# Patient Record
Sex: MALE | Race: OTHER | NOT HISPANIC OR LATINO | URBAN - METROPOLITAN AREA
[De-identification: names, ages, dates, MRNs, and addresses within clinical notes are randomized per-mention and may not be internally consistent; named-entity substitution may affect disease eponyms.]

---

## 2024-04-15 ENCOUNTER — OV NP (OUTPATIENT)
Dept: URBAN - METROPOLITAN AREA CLINIC 70 | Facility: CLINIC | Age: 44
End: 2024-04-15
Payer: COMMERCIAL

## 2024-04-15 VITALS
DIASTOLIC BLOOD PRESSURE: 73 MMHG | BODY MASS INDEX: 27.2 KG/M2 | WEIGHT: 190 LBS | TEMPERATURE: 98.3 F | HEIGHT: 70 IN | HEART RATE: 85 BPM | SYSTOLIC BLOOD PRESSURE: 125 MMHG

## 2024-04-15 DIAGNOSIS — K74.60 CIRRHOSIS OF LIVER WITHOUT ASCITES, UNSPECIFIED HEPATIC CIRRHOSIS TYPE: ICD-10-CM

## 2024-04-15 DIAGNOSIS — Z86.19 HISTORY OF HEPATITIS C: ICD-10-CM

## 2024-04-15 DIAGNOSIS — K92.2 GASTROINTESTINAL HEMORRHAGE, UNSPECIFIED GASTROINTESTINAL HEMORRHAGE TYPE: ICD-10-CM

## 2024-04-15 DIAGNOSIS — D50.0 BLOOD LOSS ANEMIA: ICD-10-CM

## 2024-04-15 PROBLEM — 19943007: Status: ACTIVE | Noted: 2024-04-15

## 2024-04-15 PROBLEM — 413532003: Status: ACTIVE | Noted: 2024-04-15

## 2024-04-15 PROCEDURE — 99204 OFFICE O/P NEW MOD 45 MIN: CPT | Performed by: NURSE PRACTITIONER

## 2024-04-15 RX ORDER — PANTOPRAZOLE SODIUM 40 MG/1
1 TABLET TABLET, DELAYED RELEASE ORAL ONCE A DAY
Qty: 90 TABLET | Refills: 3

## 2024-04-15 RX ORDER — FERROUS SULFATE 325(65) MG
TAKE 1 TABLET BY MOUTH THREE TIMES DAILY TABLET ORAL
Qty: 90 EACH | Refills: 0 | Status: ACTIVE | COMMUNITY

## 2024-04-15 RX ORDER — PANTOPRAZOLE 40 MG/1
TAKE 1 TABLET BY MOUTH EVERY DAY TABLET, DELAYED RELEASE ORAL
Qty: 30 EACH | Refills: 1 | Status: ACTIVE | COMMUNITY

## 2024-04-15 NOTE — HPI-TODAY'S VISIT:
Patient is a 44 y/o male who presents today for GI bleed/anemia. He has has GI bleeding with severe blood loss anemia since last December. He has had multiple ER/hospitalizations with multiple blood transfusions and scopes with no source found. In december 2023 Hgb was ~ 2. He was given blood and underwent scopes by provider in [place] and again had scopes at [place] however records are unavailable. He most recently went to [facility] 3 days ago with c/o dark/maroon stool with Hgb found to be 4.8 on admission with 2 units PRBCs given and CTA of abdomen showing cirrhosis (new dx; hx of HCV s/p tx per pt; no long drinks alcohol) but no active bleeding found. It was recommended that he be admitted but he left AMA due to having to  his children. He presents today and reports continued rectal bleeding with bright red blood. Denies fever, CP, SOB, abdominal pain, wt loss, vomiting/hematemsis, diarrhea, or constipation.

## 2024-06-10 ENCOUNTER — CLAIMS CREATED FROM THE CLAIM WINDOW (OUTPATIENT)
Dept: URBAN - METROPOLITAN AREA MEDICAL CENTER 39 | Facility: MEDICAL CENTER | Age: 44
End: 2024-06-10
Payer: COMMERCIAL

## 2024-06-10 DIAGNOSIS — K62.5 RECTAL BLEEDING: ICD-10-CM

## 2024-06-10 DIAGNOSIS — K74.60 CIRRHOSIS: ICD-10-CM

## 2024-06-10 DIAGNOSIS — D50.9 IRON DEFICIENCY ANEMIA: ICD-10-CM

## 2024-06-10 PROCEDURE — 99255 IP/OBS CONSLTJ NEW/EST HI 80: CPT | Performed by: INTERNAL MEDICINE

## 2024-06-10 PROCEDURE — G8427 DOCREV CUR MEDS BY ELIG CLIN: HCPCS | Performed by: INTERNAL MEDICINE

## 2024-06-10 PROCEDURE — 99223 1ST HOSP IP/OBS HIGH 75: CPT | Performed by: INTERNAL MEDICINE

## 2024-06-11 ENCOUNTER — CLAIMS CREATED FROM THE CLAIM WINDOW (OUTPATIENT)
Dept: URBAN - METROPOLITAN AREA MEDICAL CENTER 39 | Facility: MEDICAL CENTER | Age: 44
End: 2024-06-11
Payer: COMMERCIAL

## 2024-06-11 DIAGNOSIS — K92.2 GI BLEEDING: ICD-10-CM

## 2024-06-11 DIAGNOSIS — D64.89 NORMOCYTIC ANEMIA: ICD-10-CM

## 2024-06-11 PROCEDURE — 44360 SMALL BOWEL ENDOSCOPY: CPT | Performed by: INTERNAL MEDICINE

## 2024-06-11 PROCEDURE — 91110 GI TRC IMG INTRAL ESOPH-ILE: CPT | Performed by: INTERNAL MEDICINE

## 2024-06-13 ENCOUNTER — CLAIMS CREATED FROM THE CLAIM WINDOW (OUTPATIENT)
Dept: URBAN - METROPOLITAN AREA MEDICAL CENTER 39 | Facility: MEDICAL CENTER | Age: 44
End: 2024-06-13
Payer: COMMERCIAL

## 2024-06-13 DIAGNOSIS — A63.0 CONDYLOMA: ICD-10-CM

## 2024-06-13 DIAGNOSIS — K62.5 ANAL BLEEDING: ICD-10-CM

## 2024-06-13 PROCEDURE — 45380 COLONOSCOPY AND BIOPSY: CPT | Performed by: INTERNAL MEDICINE

## 2024-06-13 PROCEDURE — 45378 DIAGNOSTIC COLONOSCOPY: CPT | Performed by: INTERNAL MEDICINE

## 2024-06-14 ENCOUNTER — CLAIMS CREATED FROM THE CLAIM WINDOW (OUTPATIENT)
Dept: URBAN - METROPOLITAN AREA MEDICAL CENTER 39 | Facility: MEDICAL CENTER | Age: 44
End: 2024-06-14

## 2024-06-14 PROCEDURE — 99232 SBSQ HOSP IP/OBS MODERATE 35: CPT | Performed by: STUDENT IN AN ORGANIZED HEALTH CARE EDUCATION/TRAINING PROGRAM
